# Patient Record
Sex: FEMALE | Race: WHITE | URBAN - METROPOLITAN AREA
[De-identification: names, ages, dates, MRNs, and addresses within clinical notes are randomized per-mention and may not be internally consistent; named-entity substitution may affect disease eponyms.]

---

## 2022-08-17 ENCOUNTER — OFFICE VISIT (OUTPATIENT)
Dept: URBAN - METROPOLITAN AREA CLINIC 109 | Facility: CLINIC | Age: 52
End: 2022-08-17

## 2022-08-17 VITALS — HEIGHT: 64 IN

## 2022-08-17 RX ORDER — AMITRIPTYLINE HYDROCHLORIDE 50 MG/1
1 TABLET AT BEDTIME TABLET, FILM COATED ORAL ONCE A DAY
Status: ACTIVE | COMMUNITY

## 2022-08-17 RX ORDER — GABAPENTIN 400 MG/1
1 CAPSULE CAPSULE ORAL ONCE A DAY
Status: ACTIVE | COMMUNITY

## 2022-08-17 RX ORDER — ALBUTEROL SULFATE 90 UG/1
1 PUFF AS NEEDED AEROSOL, METERED RESPIRATORY (INHALATION)
Status: ACTIVE | COMMUNITY

## 2022-08-17 RX ORDER — HUMAN INSULIN 100 [IU]/ML
AS DIRECTED INJECTION, SUSPENSION SUBCUTANEOUS
Status: ACTIVE | COMMUNITY

## 2022-08-17 RX ORDER — ATORVASTATIN CALCIUM 40 MG/1
1 TABLET TABLET, FILM COATED ORAL ONCE A DAY
Status: ACTIVE | COMMUNITY

## 2022-08-17 RX ORDER — SUCRALFATE 1 G/1
1 TABLET ON AN EMPTY STOMACH TABLET ORAL TWICE A DAY
Status: ACTIVE | COMMUNITY

## 2022-08-17 RX ORDER — FENOFIBRATE 160 MG/1
1 TABLET TABLET ORAL ONCE A DAY
Status: ACTIVE | COMMUNITY

## 2022-08-17 RX ORDER — PANTOPRAZOLE SODIUM 40 MG/1
1 TABLET TABLET, DELAYED RELEASE ORAL TWICE A DAY
Status: ACTIVE | COMMUNITY

## 2022-08-17 RX ORDER — ERTUGLIFLOZIN 5 MG/1
1 TABLET TABLET, FILM COATED ORAL ONCE A DAY
Status: ACTIVE | COMMUNITY

## 2022-11-09 ENCOUNTER — WEB ENCOUNTER (OUTPATIENT)
Dept: URBAN - METROPOLITAN AREA CLINIC 118 | Facility: CLINIC | Age: 52
End: 2022-11-09

## 2022-11-15 ENCOUNTER — DASHBOARD ENCOUNTERS (OUTPATIENT)
Age: 52
End: 2022-11-15

## 2022-11-15 ENCOUNTER — OFFICE VISIT (OUTPATIENT)
Dept: URBAN - METROPOLITAN AREA CLINIC 118 | Facility: CLINIC | Age: 52
End: 2022-11-15
Payer: COMMERCIAL

## 2022-11-15 VITALS
BODY MASS INDEX: 49.1 KG/M2 | HEART RATE: 89 BPM | WEIGHT: 287.6 LBS | HEIGHT: 64 IN | DIASTOLIC BLOOD PRESSURE: 79 MMHG | SYSTOLIC BLOOD PRESSURE: 130 MMHG | TEMPERATURE: 97.2 F

## 2022-11-15 DIAGNOSIS — K75.81 NASH (NONALCOHOLIC STEATOHEPATITIS): ICD-10-CM

## 2022-11-15 DIAGNOSIS — R74.8 ABNORMAL LIVER ENZYMES: ICD-10-CM

## 2022-11-15 PROBLEM — 442685003: Status: ACTIVE | Noted: 2022-11-15

## 2022-11-15 PROBLEM — 166643006: Status: ACTIVE | Noted: 2022-11-15

## 2022-11-15 PROCEDURE — 99244 OFF/OP CNSLTJ NEW/EST MOD 40: CPT | Performed by: INTERNAL MEDICINE

## 2022-11-15 PROCEDURE — 99204 OFFICE O/P NEW MOD 45 MIN: CPT | Performed by: INTERNAL MEDICINE

## 2022-11-15 RX ORDER — HUMAN INSULIN 100 [IU]/ML
AS DIRECTED INJECTION, SUSPENSION SUBCUTANEOUS
Status: ACTIVE | COMMUNITY

## 2022-11-15 RX ORDER — SUCRALFATE 1 G/1
1 TABLET ON AN EMPTY STOMACH TABLET ORAL TWICE A DAY
Status: ACTIVE | COMMUNITY

## 2022-11-15 RX ORDER — ALBUTEROL SULFATE 90 UG/1
1 PUFF AS NEEDED AEROSOL, METERED RESPIRATORY (INHALATION)
Status: ACTIVE | COMMUNITY

## 2022-11-15 RX ORDER — ATORVASTATIN CALCIUM 40 MG/1
1 TABLET TABLET, FILM COATED ORAL ONCE A DAY
Status: ACTIVE | COMMUNITY

## 2022-11-15 RX ORDER — AMITRIPTYLINE HYDROCHLORIDE 50 MG/1
1 TABLET AT BEDTIME TABLET, FILM COATED ORAL ONCE A DAY
Status: ACTIVE | COMMUNITY

## 2022-11-15 RX ORDER — ERTUGLIFLOZIN 5 MG/1
1 TABLET TABLET, FILM COATED ORAL ONCE A DAY
Status: ACTIVE | COMMUNITY

## 2022-11-15 RX ORDER — GABAPENTIN 400 MG/1
1 CAPSULE CAPSULE ORAL ONCE A DAY
Status: ACTIVE | COMMUNITY

## 2022-11-15 RX ORDER — FENOFIBRATE 160 MG/1
1 TABLET TABLET ORAL ONCE A DAY
Status: ACTIVE | COMMUNITY

## 2022-11-15 RX ORDER — PANTOPRAZOLE SODIUM 40 MG/1
1 TABLET TABLET, DELAYED RELEASE ORAL TWICE A DAY
Status: ACTIVE | COMMUNITY

## 2024-09-11 NOTE — HPI-TODAY'S VISIT:
51 yo WF referred for elevated LFTs.  She had AST/ALT = 41/45 in 6/2022.  She went to [facility] ER in 10/2022 with RUQ pain, and had normal LFTs, and plt count.  CT showed fatty liver, and mild splenomegaly, with possibly spleno-renal shunt.  Liver did not have cirrhotic morphology. Pt has DM that is not well controlled.  No other known hx of liver disease, though long-standing fatty liver. Pt was seeing Dr. Jacobsen in the past and is on pantoprazole and Carafate for GERD with good control.  She occ needs PeptoBismol periodically. 11-Sep-2024 13:24